# Patient Record
Sex: FEMALE | Race: BLACK OR AFRICAN AMERICAN
[De-identification: names, ages, dates, MRNs, and addresses within clinical notes are randomized per-mention and may not be internally consistent; named-entity substitution may affect disease eponyms.]

---

## 2019-03-01 ENCOUNTER — HOSPITAL ENCOUNTER (EMERGENCY)
Dept: HOSPITAL 72 - EMR | Age: 69
Discharge: HOME | End: 2019-03-01
Payer: MEDICARE

## 2019-03-01 VITALS — SYSTOLIC BLOOD PRESSURE: 144 MMHG | DIASTOLIC BLOOD PRESSURE: 76 MMHG

## 2019-03-01 VITALS — DIASTOLIC BLOOD PRESSURE: 72 MMHG | SYSTOLIC BLOOD PRESSURE: 130 MMHG

## 2019-03-01 VITALS — WEIGHT: 215 LBS | BODY MASS INDEX: 35.82 KG/M2 | HEIGHT: 65 IN

## 2019-03-01 DIAGNOSIS — M54.5: ICD-10-CM

## 2019-03-01 DIAGNOSIS — S13.9XXA: Primary | ICD-10-CM
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
## 2019-03-01 DIAGNOSIS — I10: ICD-10-CM

## 2019-03-01 PROCEDURE — 99284 EMERGENCY DEPT VISIT MOD MDM: CPT

## 2019-03-01 PROCEDURE — 72040 X-RAY EXAM NECK SPINE 2-3 VW: CPT

## 2019-03-01 PROCEDURE — 72020 X-RAY EXAM OF SPINE 1 VIEW: CPT
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
## 2019-03-01 NOTE — NUR
ER DISCHARGE NOTE:



Patient is cleared to be discharged per ERMD, pt is aox4, on room air, with 
stable vital signs. pt was given dc and prescription instructions, pt was able 
to verbalize understanding, pt id band removed. pt is able to ambulate with 
steady gait. pt took all belongings.
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
## 2019-03-01 NOTE — NUR
ED Nurse Note:



Patient was told to remove Lidocane patch tonight before going to bed. pt 
verbalized understanding.
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
